# Patient Record
Sex: FEMALE | Race: WHITE | NOT HISPANIC OR LATINO | Employment: FULL TIME | URBAN - METROPOLITAN AREA
[De-identification: names, ages, dates, MRNs, and addresses within clinical notes are randomized per-mention and may not be internally consistent; named-entity substitution may affect disease eponyms.]

---

## 2023-02-25 PROBLEM — S92.354A CLOSED NONDISPLACED FRACTURE OF FIFTH RIGHT METATARSAL BONE: Status: ACTIVE | Noted: 2023-02-25

## 2023-02-25 PROBLEM — F41.9 ANXIETY: Status: ACTIVE | Noted: 2023-02-25

## 2023-02-25 PROBLEM — L70.9 ACNE: Status: ACTIVE | Noted: 2023-02-25

## 2023-02-25 PROBLEM — G56.03 CARPAL TUNNEL SYNDROME ON BOTH SIDES: Status: ACTIVE | Noted: 2023-02-25

## 2023-02-25 PROBLEM — N92.1 MENOMETRORRHAGIA: Status: ACTIVE | Noted: 2023-02-25

## 2023-02-25 PROBLEM — N95.2 VAGINAL ATROPHY: Status: ACTIVE | Noted: 2023-02-25

## 2023-02-25 PROBLEM — Z12.11 ENCOUNTER FOR SCREENING COLONOSCOPY: Status: ACTIVE | Noted: 2023-02-25

## 2023-02-25 PROBLEM — M17.10 ARTHRITIS OF KNEE: Status: ACTIVE | Noted: 2023-02-25

## 2023-02-25 PROBLEM — R05.9 COUGH: Status: ACTIVE | Noted: 2023-02-25

## 2023-02-25 PROBLEM — S99.911A ANKLE INJURY, RIGHT, INITIAL ENCOUNTER: Status: ACTIVE | Noted: 2023-02-25

## 2023-02-25 PROBLEM — I10 HTN (HYPERTENSION): Status: ACTIVE | Noted: 2023-02-25

## 2023-02-25 PROBLEM — H01.119 ALLERGIC BLEPHARITIS: Status: ACTIVE | Noted: 2023-02-25

## 2023-02-25 PROBLEM — R92.30 DENSE BREAST TISSUE: Status: ACTIVE | Noted: 2023-02-25

## 2023-02-25 RX ORDER — TRIAMTERENE AND HYDROCHLOROTHIAZIDE 37.5; 25 MG/1; MG/1
1 CAPSULE ORAL DAILY
COMMUNITY
Start: 2018-05-29 | End: 2023-09-15

## 2023-03-21 ENCOUNTER — OFFICE VISIT (OUTPATIENT)
Dept: PRIMARY CARE | Facility: CLINIC | Age: 63
End: 2023-03-21
Payer: COMMERCIAL

## 2023-03-21 VITALS
BODY MASS INDEX: 27.06 KG/M2 | SYSTOLIC BLOOD PRESSURE: 120 MMHG | OXYGEN SATURATION: 98 % | WEIGHT: 152.78 LBS | DIASTOLIC BLOOD PRESSURE: 72 MMHG | HEART RATE: 84 BPM

## 2023-03-21 DIAGNOSIS — I10 PRIMARY HYPERTENSION: ICD-10-CM

## 2023-03-21 DIAGNOSIS — Z13.220 LIPID SCREENING: Primary | ICD-10-CM

## 2023-03-21 PROCEDURE — 3078F DIAST BP <80 MM HG: CPT | Performed by: INTERNAL MEDICINE

## 2023-03-21 PROCEDURE — 3074F SYST BP LT 130 MM HG: CPT | Performed by: INTERNAL MEDICINE

## 2023-03-21 PROCEDURE — 1036F TOBACCO NON-USER: CPT | Performed by: INTERNAL MEDICINE

## 2023-03-21 PROCEDURE — UHSPHYS PR UH SELECT PHYSICAL: Performed by: INTERNAL MEDICINE

## 2023-03-21 RX ORDER — CLINDAMYCIN PHOSPHATE 10 UG/ML
LOTION TOPICAL
COMMUNITY
Start: 2023-03-02 | End: 2023-03-21 | Stop reason: ALTCHOICE

## 2023-03-21 RX ORDER — METRONIDAZOLE 7.5 MG/G
GEL TOPICAL
COMMUNITY
Start: 2022-04-08 | End: 2023-03-21 | Stop reason: ALTCHOICE

## 2023-03-21 ASSESSMENT — ENCOUNTER SYMPTOMS
UNEXPECTED WEIGHT CHANGE: 0
BACK PAIN: 0
SINUS PAIN: 0
CONFUSION: 0
DIZZINESS: 0
NAUSEA: 0
CONSTITUTIONAL NEGATIVE: 1
WEAKNESS: 0
FREQUENCY: 0
ABDOMINAL DISTENTION: 0
NECK STIFFNESS: 0
FEVER: 0
SLEEP DISTURBANCE: 0
BLOOD IN STOOL: 0
FACIAL SWELLING: 0
CHILLS: 0
DYSPHORIC MOOD: 0
PALPITATIONS: 0
SINUS PRESSURE: 0
FATIGUE: 0
ARTHRALGIAS: 0
CONSTIPATION: 0
MYALGIAS: 0
ACTIVITY CHANGE: 0
NERVOUS/ANXIOUS: 0
ABDOMINAL PAIN: 0
DYSURIA: 0
ADENOPATHY: 0
LIGHT-HEADEDNESS: 0
HYPERACTIVE: 0
CHEST TIGHTNESS: 0
BRUISES/BLEEDS EASILY: 0
SHORTNESS OF BREATH: 0
VOMITING: 0
JOINT SWELLING: 0
POLYDIPSIA: 0
TROUBLE SWALLOWING: 0
WHEEZING: 0
HEADACHES: 0
COUGH: 0
SORE THROAT: 0
DIARRHEA: 0
HEMATURIA: 0
RHINORRHEA: 0
NUMBNESS: 0

## 2023-03-21 NOTE — PROGRESS NOTES
Subjective   Patient ID: Tio Fuentes is a 62 y.o. female who presents for Annual Exam (Formerly [facility]).  Patient is here for complete physical and a general checkup.  She is generally quite healthy without any major complaints.  She does have some mild upper respiratory symptoms which she has had off-and-on over the last few months typically she wakes up with a bit of a stuffy nose and a bit of a scratchy throat but has no real major symptoms during the daytime.  She did have a very bad respiratory infection last fall and despite numerous COVID test that were negative I suspect she did have COVID.  She lost her sense of smell and was quite ill at that time.  She has made a full recovery however.  She is in the middle of getting a work-up for a abnormal mammogram and is scheduled for a repeat mammogram and ultrasound in the near future.  She had bilateral carpal tunnel release and had surgery on her right foot in the last couple of years.  She is made a pretty good recovery from that and is back to working out full-time.  She gets occasional lightheadedness when she stands up quickly but has no difficulty with exercising.  Reviewing her labs her lipids are slightly above target but she had a cardiac calcium score of 0 a number of years ago.  She had a bone density study done 6 months ago which was relatively normal.        Review of Systems   Constitutional: Negative.  Negative for activity change, chills, fatigue, fever and unexpected weight change.   HENT:  Negative for dental problem, ear pain, facial swelling, hearing loss, mouth sores, rhinorrhea, sinus pressure, sinus pain, sore throat, tinnitus and trouble swallowing.    Eyes:  Negative for visual disturbance.   Respiratory:  Negative for cough, chest tightness, shortness of breath and wheezing.    Cardiovascular:  Negative for chest pain, palpitations and leg swelling.   Gastrointestinal:  Negative for abdominal distention, abdominal pain, blood in  stool, constipation, diarrhea, nausea and vomiting.        Colon due in 2029   Endocrine: Negative for cold intolerance, heat intolerance, polydipsia and polyuria.   Genitourinary:  Negative for dysuria, frequency, hematuria and urgency.        Occ stress incont   Musculoskeletal:  Negative for arthralgias, back pain, joint swelling, myalgias and neck stiffness.        S/P bilat carple tunnel release.  R Foot surgery    Skin:  Negative for rash.   Allergic/Immunologic: Negative for food allergies.   Neurological:  Negative for dizziness, weakness, light-headedness, numbness and headaches.   Hematological:  Negative for adenopathy. Does not bruise/bleed easily.   Psychiatric/Behavioral:  Negative for confusion, dysphoric mood and sleep disturbance. The patient is not nervous/anxious and is not hyperactive.        Objective   Physical Exam  Vitals and nursing note reviewed.   Constitutional:       Appearance: Normal appearance. She is normal weight.   HENT:      Head: Normocephalic and atraumatic.      Right Ear: Ear canal and external ear normal. There is impacted cerumen.      Left Ear: Ear canal and external ear normal. There is impacted cerumen.      Nose: Nose normal.      Mouth/Throat:      Mouth: Mucous membranes are moist.      Pharynx: Oropharynx is clear.   Eyes:      Extraocular Movements: Extraocular movements intact.      Conjunctiva/sclera: Conjunctivae normal.      Pupils: Pupils are equal, round, and reactive to light.   Cardiovascular:      Rate and Rhythm: Normal rate and regular rhythm.      Pulses: Normal pulses.      Heart sounds: Normal heart sounds.   Pulmonary:      Effort: Pulmonary effort is normal.      Breath sounds: Normal breath sounds.   Abdominal:      General: Abdomen is flat. Bowel sounds are normal.      Palpations: Abdomen is soft.   Musculoskeletal:         General: Normal range of motion.      Cervical back: Normal range of motion and neck supple.   Lymphadenopathy:      Cervical:  No cervical adenopathy.   Skin:     General: Skin is warm and dry.   Neurological:      General: No focal deficit present.      Mental Status: She is alert and oriented to person, place, and time. Mental status is at baseline.   Psychiatric:         Mood and Affect: Mood normal.         Thought Content: Thought content normal.       Lab Results   Component Value Date    WBC 4.2 (L) 01/13/2023    HGB 13.3 01/13/2023    HCT 38.3 01/13/2023    MCV 93 01/13/2023     01/13/2023       Assessment/Plan     HTN (hypertension)  BP is under good control.  We will continue her current triamterene hydrochlorothiazide prescription.   Patient is up-to-date on colonoscopy  Patient is up-to-date on bone density  Patient is undergoing a work-up for her abnormal mammogram.  Patient is up-to-date on all vaccines  Patient is ears were densely impacted we will have her use Cerumenex and return next week to have them lavaged out.

## 2023-03-21 NOTE — ASSESSMENT & PLAN NOTE
BP is under good control.  We will continue her current triamterene hydrochlorothiazide prescription.

## 2023-03-28 ENCOUNTER — APPOINTMENT (OUTPATIENT)
Dept: PRIMARY CARE | Facility: CLINIC | Age: 63
End: 2023-03-28
Payer: COMMERCIAL

## 2023-09-01 DIAGNOSIS — N30.00 ACUTE CYSTITIS WITHOUT HEMATURIA: Primary | ICD-10-CM

## 2023-09-01 RX ORDER — SULFAMETHOXAZOLE AND TRIMETHOPRIM 800; 160 MG/1; MG/1
1 TABLET ORAL 2 TIMES DAILY
Qty: 6 TABLET | Refills: 3 | Status: SHIPPED | OUTPATIENT
Start: 2023-09-01 | End: 2023-09-13

## 2023-09-15 DIAGNOSIS — I10 PRIMARY HYPERTENSION: Primary | ICD-10-CM

## 2023-09-15 RX ORDER — TRIAMTERENE AND HYDROCHLOROTHIAZIDE 37.5; 25 MG/1; MG/1
1 CAPSULE ORAL DAILY
Qty: 90 CAPSULE | Refills: 2 | Status: SHIPPED | OUTPATIENT
Start: 2023-09-15

## 2023-11-07 ENCOUNTER — OFFICE VISIT (OUTPATIENT)
Dept: PRIMARY CARE | Facility: CLINIC | Age: 63
End: 2023-11-07
Payer: COMMERCIAL

## 2023-11-07 VITALS — DIASTOLIC BLOOD PRESSURE: 62 MMHG | HEART RATE: 74 BPM | SYSTOLIC BLOOD PRESSURE: 118 MMHG | OXYGEN SATURATION: 97 %

## 2023-11-07 DIAGNOSIS — J06.9 VIRAL UPPER RESPIRATORY TRACT INFECTION: Primary | ICD-10-CM

## 2023-11-07 PROCEDURE — 1036F TOBACCO NON-USER: CPT | Performed by: INTERNAL MEDICINE

## 2023-11-07 PROCEDURE — 3078F DIAST BP <80 MM HG: CPT | Performed by: INTERNAL MEDICINE

## 2023-11-07 PROCEDURE — 3074F SYST BP LT 130 MM HG: CPT | Performed by: INTERNAL MEDICINE

## 2023-11-07 PROCEDURE — 99213 OFFICE O/P EST LOW 20 MIN: CPT | Performed by: INTERNAL MEDICINE

## 2023-11-07 RX ORDER — BENZONATATE 200 MG/1
200 CAPSULE ORAL 3 TIMES DAILY PRN
COMMUNITY
Start: 2023-09-07

## 2023-11-07 ASSESSMENT — ENCOUNTER SYMPTOMS
SORE THROAT: 1
WHEEZING: 0
SWOLLEN GLANDS: 0
HEADACHES: 0
RHINORRHEA: 1
COUGH: 1

## 2023-11-07 NOTE — ASSESSMENT & PLAN NOTE
Most likely viral upper respiratory infection with some mild serous otitis on the right patient will continue to use decongestants Flonase and Afrin nasal spray.  She is instructed to use the Afrin for no more than 4 days.  She will continue taking Tylenol for the aches and pains and sore throat.  No indication for antibiotics at this time.  She will call if symptoms do not improve.

## 2023-11-07 NOTE — PROGRESS NOTES
Subjective   Patient ID: Tio Fuentes is a 63 y.o. female who presents for URI.    URI   This is a new problem. The current episode started in the past 7 days. The problem has been unchanged. There has been no fever. Associated symptoms include congestion, coughing, a plugged ear sensation, rhinorrhea, sneezing and a sore throat. Pertinent negatives include no chest pain, ear pain, headaches, swollen glands or wheezing. She has tried antihistamine, decongestant and acetaminophen for the symptoms. The treatment provided mild relief.        Review of Systems   HENT:  Positive for congestion, rhinorrhea, sneezing and sore throat. Negative for ear pain.    Respiratory:  Positive for cough. Negative for wheezing.    Cardiovascular:  Negative for chest pain.   Neurological:  Negative for headaches.       Objective   /62   Pulse 74   SpO2 97%     Physical Exam  HENT:      Right Ear: Tympanic membrane normal.      Left Ear: Tympanic membrane normal.      Ears:      Comments: Small amount of serous material behind the right TM no erythema or purulence.     Nose: Nose normal.      Mouth/Throat:      Pharynx: Oropharynx is clear. No oropharyngeal exudate or posterior oropharyngeal erythema.   Cardiovascular:      Heart sounds: Normal heart sounds.   Pulmonary:      Breath sounds: Normal breath sounds.   Lymphadenopathy:      Cervical: No cervical adenopathy.         Assessment/Plan   Problem List Items Addressed This Visit             ICD-10-CM    Viral upper respiratory tract infection - Primary J06.9     Most likely viral upper respiratory infection with some mild serous otitis on the right patient will continue to use decongestants Flonase and Afrin nasal spray.  She is instructed to use the Afrin for no more than 4 days.  She will continue taking Tylenol for the aches and pains and sore throat.  No indication for antibiotics at this time.  She will call if symptoms do not improve.

## 2023-12-12 ENCOUNTER — TELEPHONE (OUTPATIENT)
Dept: PRIMARY CARE | Facility: CLINIC | Age: 63
End: 2023-12-12
Payer: COMMERCIAL

## 2023-12-12 DIAGNOSIS — H10.33 ACUTE CONJUNCTIVITIS OF BOTH EYES, UNSPECIFIED ACUTE CONJUNCTIVITIS TYPE: Primary | ICD-10-CM

## 2023-12-12 RX ORDER — TOBRAMYCIN 3 MG/ML
1 SOLUTION/ DROPS OPHTHALMIC EVERY 4 HOURS
Qty: 5 ML | Refills: 0 | Status: SHIPPED | OUTPATIENT
Start: 2023-12-12 | End: 2023-12-26

## 2023-12-12 NOTE — TELEPHONE ENCOUNTER
----- Message from Sarkis Singer MA sent at 12/12/2023 10:00 AM EST -----  Contact: [phone]  Patient states she has pink eye. Both of her grand kids have it.  She wonders if you can call eye drops in for her to [facility].    Call the patient children and grandchildren all have the same condition.  Will start her on Tobrex eyedrops she will call if symptoms do not improve.

## 2024-02-23 ENCOUNTER — TELEPHONE (OUTPATIENT)
Dept: OBSTETRICS AND GYNECOLOGY | Facility: CLINIC | Age: 64
End: 2024-02-23

## 2024-02-23 ENCOUNTER — LAB (OUTPATIENT)
Dept: LAB | Facility: LAB | Age: 64
End: 2024-02-23
Payer: COMMERCIAL

## 2024-02-23 DIAGNOSIS — Z79.899 OTHER LONG TERM (CURRENT) DRUG THERAPY: ICD-10-CM

## 2024-02-23 DIAGNOSIS — Z12.31 SCREENING MAMMOGRAM FOR BREAST CANCER: Primary | ICD-10-CM

## 2024-02-23 DIAGNOSIS — B35.1 TINEA UNGUIUM: Primary | ICD-10-CM

## 2024-02-23 LAB
ALBUMIN SERPL BCP-MCNC: 4.8 G/DL (ref 3.4–5)
ALP SERPL-CCNC: 64 U/L (ref 33–136)
ALT SERPL W P-5'-P-CCNC: 25 U/L (ref 7–45)
AST SERPL W P-5'-P-CCNC: 22 U/L (ref 9–39)
BILIRUB DIRECT SERPL-MCNC: 0.1 MG/DL (ref 0–0.3)
BILIRUB SERPL-MCNC: 0.7 MG/DL (ref 0–1.2)
PROT SERPL-MCNC: 7.4 G/DL (ref 6.4–8.2)

## 2024-02-23 PROCEDURE — 80076 HEPATIC FUNCTION PANEL: CPT

## 2024-02-23 NOTE — TELEPHONE ENCOUNTER
Pt needs mammogram order sent in was supposed to have appt feb 23 rescheduled to may 28 please advise thank you

## 2024-03-01 ENCOUNTER — APPOINTMENT (OUTPATIENT)
Dept: PRIMARY CARE | Facility: CLINIC | Age: 64
End: 2024-03-01
Payer: COMMERCIAL

## 2024-03-01 DIAGNOSIS — J02.9 EXUDATIVE PHARYNGITIS: Primary | ICD-10-CM

## 2024-03-01 RX ORDER — AMOXICILLIN 500 MG/1
500 CAPSULE ORAL EVERY 8 HOURS SCHEDULED
Qty: 30 CAPSULE | Refills: 0 | Status: SHIPPED | OUTPATIENT
Start: 2024-03-01 | End: 2024-03-11

## 2024-03-01 NOTE — PROGRESS NOTES
Spoke with patient who has an upper respiratory infection with a severe sore throat.  She has severe sore throat that makes it difficult for her to swallow she has tender cervical adenopathy and she has been around a couple of grandchildren that have had strep.  She states that this is similar symptoms to what she had in the past when she has had strep as a young person.  Patient is unable to come in today but we will treat her presumptively for strep pharyngitis with amoxicillin 5 mg 3 times a day for 7 to 10 days.  She will call if symptoms do not improve.

## 2024-04-04 ENCOUNTER — HOSPITAL ENCOUNTER (OUTPATIENT)
Dept: RADIOLOGY | Facility: CLINIC | Age: 64
Discharge: HOME | End: 2024-04-04
Payer: COMMERCIAL

## 2024-04-04 VITALS — BODY MASS INDEX: 25.69 KG/M2 | WEIGHT: 145 LBS | HEIGHT: 63 IN

## 2024-04-04 DIAGNOSIS — Z12.31 SCREENING MAMMOGRAM FOR BREAST CANCER: ICD-10-CM

## 2024-04-04 PROCEDURE — 77063 BREAST TOMOSYNTHESIS BI: CPT | Performed by: RADIOLOGY

## 2024-04-04 PROCEDURE — 77067 SCR MAMMO BI INCL CAD: CPT

## 2024-04-04 PROCEDURE — 77067 SCR MAMMO BI INCL CAD: CPT | Performed by: RADIOLOGY

## 2024-05-28 ENCOUNTER — OFFICE VISIT (OUTPATIENT)
Dept: OBSTETRICS AND GYNECOLOGY | Facility: CLINIC | Age: 64
End: 2024-05-28
Payer: COMMERCIAL

## 2024-05-28 VITALS
WEIGHT: 157 LBS | DIASTOLIC BLOOD PRESSURE: 74 MMHG | BODY MASS INDEX: 27.82 KG/M2 | HEIGHT: 63 IN | SYSTOLIC BLOOD PRESSURE: 123 MMHG

## 2024-05-28 DIAGNOSIS — Z01.419 ENCOUNTER FOR GYNECOLOGICAL EXAMINATION WITHOUT ABNORMAL FINDING: Primary | ICD-10-CM

## 2024-05-28 DIAGNOSIS — Z78.0 OSTEOPENIA AFTER MENOPAUSE: ICD-10-CM

## 2024-05-28 DIAGNOSIS — M85.80 OSTEOPENIA AFTER MENOPAUSE: ICD-10-CM

## 2024-05-28 PROCEDURE — 1036F TOBACCO NON-USER: CPT | Performed by: OBSTETRICS & GYNECOLOGY

## 2024-05-28 PROCEDURE — 3074F SYST BP LT 130 MM HG: CPT | Performed by: OBSTETRICS & GYNECOLOGY

## 2024-05-28 PROCEDURE — 99396 PREV VISIT EST AGE 40-64: CPT | Performed by: OBSTETRICS & GYNECOLOGY

## 2024-05-28 PROCEDURE — 3078F DIAST BP <80 MM HG: CPT | Performed by: OBSTETRICS & GYNECOLOGY

## 2024-05-28 NOTE — PROGRESS NOTES
Subjective   Tio Fuentes is a 63 y.o. female here for a routine exam.  We discussed she returned yesterday from a trip to [place].  She has been feeling well.    No postmenopausal bleeding or pelvic pain.  No dysuria, no change in bowel habits or vaginal discharge.    She is current on her colonoscopy.    She has a bone density from May 2022 that shows a T-score of the left hip -2.2.  Personal health questionnaire reviewed: yes.     Gynecologic History  No LMP recorded. Patient is postmenopausal.  Contraception: post menopausal status  Last Pap: 23. Results were: normal  Last mammogram: 24. Results were: normal    Obstetric History  OB History    Para Term  AB Living   4 2           SAB IAB Ectopic Multiple Live Births                  # Outcome Date GA Lbr Malcolm/2nd Weight Sex Delivery Anes PTL Lv   4             3             2 Para            1 Para                Objective   Constitutional: Alert and in no acute distress. Well developed, well nourished.   Head and Face: Head and face: Normal.    Eyes: Normal external exam - nonicteric sclera, extraocular movements intact (EOMI) and no ptosis.   Neck: No neck asymmetry. Supple. Thyroid not enlarged and there were no palpable thyroid nodules.    Pulmonary: No respiratory distress.   Chest: Breasts: Normal appearance, no nipple discharge and no skin changes. Palpation of breasts and axillae: No palpable mass and no axillary lymphadenopathy.   Abdomen: Soft nontender; no abdominal mass palpated. No organomegaly. No hernias.  Well-healed abdominoplasty scars noted.  Genitourinary: External genitalia: Normal. No inguinal lymphadenopathy. Bartholin's Urethral and Skenes Glands: Normal. Urethra: Normal.  Bladder: Normal on palpation. Vagina: Normal. Cervix: Normal.  Uterus: Normal.  Right Adnexa/parametria: Normal.  Left Adnexa/parametria: Normal.  Inspection of Perianal Area: Normal.   Musculoskeletal: No joint swelling seen,  normal movements of all extremities.   Skin: Normal skin color and pigmentation, normal skin turgor, and no rash.   Neurologic: Non-focal. Grossly intact.   Psychiatric: Alert and oriented x 3. Affect normal to patient baseline. Mood: Appropriate.  Physical Exam     Assessment/Plan   Healthy female exam.  This is a 63-year-old female with a normal exam.  No Pap was sent, she is high risk HPV negative.    Her routine mammogram is due in April 2025.    We discussed obtaining a bone density test to monitor the osteopenia.  We briefly discussed medications such as Fosamax if there is a drop in bone density.  I will see her routinely in 1 year.  Mammogram ordered.

## 2024-06-06 ENCOUNTER — HOSPITAL ENCOUNTER (OUTPATIENT)
Dept: RADIOLOGY | Facility: CLINIC | Age: 64
Discharge: HOME | End: 2024-06-06
Payer: COMMERCIAL

## 2024-06-06 DIAGNOSIS — M85.80 OSTEOPENIA AFTER MENOPAUSE: ICD-10-CM

## 2024-06-06 DIAGNOSIS — Z78.0 OSTEOPENIA AFTER MENOPAUSE: ICD-10-CM

## 2024-06-06 PROCEDURE — 77080 DXA BONE DENSITY AXIAL: CPT

## 2024-07-22 DIAGNOSIS — Z00.00 ANNUAL PHYSICAL EXAM: ICD-10-CM

## 2024-07-29 DIAGNOSIS — I10 PRIMARY HYPERTENSION: ICD-10-CM

## 2024-07-29 RX ORDER — TRIAMTERENE AND HYDROCHLOROTHIAZIDE 37.5; 25 MG/1; MG/1
1 CAPSULE ORAL DAILY
Qty: 90 CAPSULE | Refills: 2 | Status: SHIPPED | OUTPATIENT
Start: 2024-07-29

## 2024-08-22 ENCOUNTER — LAB (OUTPATIENT)
Dept: LAB | Facility: LAB | Age: 64
End: 2024-08-22
Payer: COMMERCIAL

## 2024-08-22 DIAGNOSIS — Z00.00 ANNUAL PHYSICAL EXAM: ICD-10-CM

## 2024-08-22 LAB
25(OH)D3 SERPL-MCNC: 32 NG/ML (ref 30–100)
ALBUMIN SERPL BCP-MCNC: 4.6 G/DL (ref 3.4–5)
ALP SERPL-CCNC: 60 U/L (ref 33–136)
ALT SERPL W P-5'-P-CCNC: 20 U/L (ref 7–45)
ANION GAP SERPL CALC-SCNC: 13 MMOL/L (ref 10–20)
APPEARANCE UR: CLEAR
AST SERPL W P-5'-P-CCNC: 20 U/L (ref 9–39)
BASOPHILS # BLD AUTO: 0.03 X10*3/UL (ref 0–0.1)
BASOPHILS NFR BLD AUTO: 0.6 %
BILIRUB SERPL-MCNC: 0.9 MG/DL (ref 0–1.2)
BILIRUB UR STRIP.AUTO-MCNC: NEGATIVE MG/DL
BUN SERPL-MCNC: 18 MG/DL (ref 6–23)
CALCIUM SERPL-MCNC: 9.6 MG/DL (ref 8.6–10.3)
CHLORIDE SERPL-SCNC: 101 MMOL/L (ref 98–107)
CHOLEST SERPL-MCNC: 214 MG/DL (ref 0–199)
CHOLESTEROL/HDL RATIO: 4
CO2 SERPL-SCNC: 27 MMOL/L (ref 21–32)
COLOR UR: COLORLESS
CREAT SERPL-MCNC: 0.91 MG/DL (ref 0.5–1.05)
EGFRCR SERPLBLD CKD-EPI 2021: 71 ML/MIN/1.73M*2
EOSINOPHIL # BLD AUTO: 0.06 X10*3/UL (ref 0–0.7)
EOSINOPHIL NFR BLD AUTO: 1.2 %
ERYTHROCYTE [DISTWIDTH] IN BLOOD BY AUTOMATED COUNT: 11.7 % (ref 11.5–14.5)
EST. AVERAGE GLUCOSE BLD GHB EST-MCNC: 105 MG/DL
GLUCOSE SERPL-MCNC: 86 MG/DL (ref 74–99)
GLUCOSE UR STRIP.AUTO-MCNC: NORMAL MG/DL
HBA1C MFR BLD: 5.3 %
HCT VFR BLD AUTO: 41.9 % (ref 36–46)
HDLC SERPL-MCNC: 53.3 MG/DL
HGB BLD-MCNC: 14.6 G/DL (ref 12–16)
IMM GRANULOCYTES # BLD AUTO: 0.01 X10*3/UL (ref 0–0.7)
IMM GRANULOCYTES NFR BLD AUTO: 0.2 % (ref 0–0.9)
KETONES UR STRIP.AUTO-MCNC: NEGATIVE MG/DL
LDLC SERPL CALC-MCNC: 136 MG/DL
LEUKOCYTE ESTERASE UR QL STRIP.AUTO: ABNORMAL
LYMPHOCYTES # BLD AUTO: 1.88 X10*3/UL (ref 1.2–4.8)
LYMPHOCYTES NFR BLD AUTO: 37.2 %
MCH RBC QN AUTO: 33.1 PG (ref 26–34)
MCHC RBC AUTO-ENTMCNC: 34.8 G/DL (ref 32–36)
MCV RBC AUTO: 95 FL (ref 80–100)
MONOCYTES # BLD AUTO: 0.47 X10*3/UL (ref 0.1–1)
MONOCYTES NFR BLD AUTO: 9.3 %
NEUTROPHILS # BLD AUTO: 2.6 X10*3/UL (ref 1.2–7.7)
NEUTROPHILS NFR BLD AUTO: 51.5 %
NITRITE UR QL STRIP.AUTO: NEGATIVE
NON HDL CHOLESTEROL: 161 MG/DL (ref 0–149)
NRBC BLD-RTO: 0 /100 WBCS (ref 0–0)
PH UR STRIP.AUTO: 7 [PH]
PLATELET # BLD AUTO: 179 X10*3/UL (ref 150–450)
POTASSIUM SERPL-SCNC: 4.1 MMOL/L (ref 3.5–5.3)
PROT SERPL-MCNC: 7.2 G/DL (ref 6.4–8.2)
PROT UR STRIP.AUTO-MCNC: NEGATIVE MG/DL
RBC # BLD AUTO: 4.41 X10*6/UL (ref 4–5.2)
RBC # UR STRIP.AUTO: NEGATIVE /UL
RBC #/AREA URNS AUTO: NORMAL /HPF
SODIUM SERPL-SCNC: 137 MMOL/L (ref 136–145)
SP GR UR STRIP.AUTO: 1.01
TRIGL SERPL-MCNC: 126 MG/DL (ref 0–149)
TSH SERPL-ACNC: 1.97 MIU/L (ref 0.44–3.98)
UROBILINOGEN UR STRIP.AUTO-MCNC: NORMAL MG/DL
VLDL: 25 MG/DL (ref 0–40)
WBC # BLD AUTO: 5.1 X10*3/UL (ref 4.4–11.3)
WBC #/AREA URNS AUTO: NORMAL /HPF

## 2024-08-22 PROCEDURE — 80053 COMPREHEN METABOLIC PANEL: CPT

## 2024-08-22 PROCEDURE — 84443 ASSAY THYROID STIM HORMONE: CPT

## 2024-08-22 PROCEDURE — 36415 COLL VENOUS BLD VENIPUNCTURE: CPT

## 2024-08-22 PROCEDURE — 85025 COMPLETE CBC W/AUTO DIFF WBC: CPT

## 2024-08-22 PROCEDURE — 81001 URINALYSIS AUTO W/SCOPE: CPT

## 2024-08-22 PROCEDURE — 80061 LIPID PANEL: CPT

## 2024-08-30 ENCOUNTER — APPOINTMENT (OUTPATIENT)
Dept: PRIMARY CARE | Facility: CLINIC | Age: 64
End: 2024-08-30
Payer: COMMERCIAL

## 2024-09-06 ENCOUNTER — APPOINTMENT (OUTPATIENT)
Dept: PRIMARY CARE | Facility: CLINIC | Age: 64
End: 2024-09-06
Payer: COMMERCIAL

## 2024-09-06 VITALS
SYSTOLIC BLOOD PRESSURE: 120 MMHG | HEIGHT: 63 IN | HEART RATE: 100 BPM | BODY MASS INDEX: 27.54 KG/M2 | OXYGEN SATURATION: 98 % | DIASTOLIC BLOOD PRESSURE: 76 MMHG | WEIGHT: 155.4 LBS

## 2024-09-06 DIAGNOSIS — E78.2 MIXED HYPERLIPIDEMIA: ICD-10-CM

## 2024-09-06 DIAGNOSIS — M85.80 OSTEOPENIA AFTER MENOPAUSE: ICD-10-CM

## 2024-09-06 DIAGNOSIS — I10 PRIMARY HYPERTENSION: ICD-10-CM

## 2024-09-06 DIAGNOSIS — I10 PRIMARY HYPERTENSION: Primary | ICD-10-CM

## 2024-09-06 DIAGNOSIS — N95.2 VAGINAL ATROPHY: ICD-10-CM

## 2024-09-06 DIAGNOSIS — Z78.0 OSTEOPENIA AFTER MENOPAUSE: ICD-10-CM

## 2024-09-06 RX ORDER — ATORVASTATIN CALCIUM 10 MG/1
10 TABLET, FILM COATED ORAL DAILY
Qty: 30 TABLET | Refills: 11 | Status: SHIPPED | OUTPATIENT
Start: 2024-09-06 | End: 2025-09-01

## 2024-09-06 RX ORDER — ATORVASTATIN CALCIUM 10 MG/1
10 TABLET, FILM COATED ORAL DAILY
Qty: 30 TABLET | Refills: 11 | Status: SHIPPED
Start: 2024-09-06 | End: 2024-09-06 | Stop reason: SDUPTHER

## 2024-09-06 ASSESSMENT — ENCOUNTER SYMPTOMS
DIARRHEA: 0
RHINORRHEA: 0
TROUBLE SWALLOWING: 0
CONFUSION: 0
LIGHT-HEADEDNESS: 0
MYALGIAS: 0
CONSTITUTIONAL NEGATIVE: 1
CONSTIPATION: 0
HEMATURIA: 0
FEVER: 0
POLYDIPSIA: 0
COUGH: 0
SLEEP DISTURBANCE: 0
FACIAL SWELLING: 0
NECK STIFFNESS: 0
NUMBNESS: 0
SORE THROAT: 0
FREQUENCY: 0
SINUS PRESSURE: 0
CHILLS: 0
ARTHRALGIAS: 0
CHEST TIGHTNESS: 0
DYSURIA: 0
SHORTNESS OF BREATH: 0
DIZZINESS: 0
ABDOMINAL DISTENTION: 0
FATIGUE: 0
SINUS PAIN: 0
WEAKNESS: 0
VOMITING: 0
BLOOD IN STOOL: 0
ABDOMINAL PAIN: 0
UNEXPECTED WEIGHT CHANGE: 0
HYPERACTIVE: 0
NERVOUS/ANXIOUS: 0
BRUISES/BLEEDS EASILY: 0
DYSPHORIC MOOD: 0
PALPITATIONS: 0
BACK PAIN: 0
ACTIVITY CHANGE: 0
ADENOPATHY: 0
NAUSEA: 0
JOINT SWELLING: 0
HEADACHES: 0
WHEEZING: 0

## 2024-09-06 NOTE — ASSESSMENT & PLAN NOTE
Lipids are above goal with an LDL of 130.  We will start her on atorvastatin 10 mg daily.  Also get a cardiac calcium score to further stratify her risk.  She return in 6 weeks for repeat lipids and liver enzymes.

## 2024-09-06 NOTE — PROGRESS NOTES
Tio Fuentes       Patient is here for a complete physical and a general checkup.  She feels generally quite well without any major complaints.  She does have some mild pain in her right neck and shoulder which sounds like a cervical radicular symptom.  She has some paresthesias in the trapezius and right shoulder from time to time there is not much in the way of pain there is no radiation of the pain down the arm.  There is no symptoms on the left.  She has not taken any medication for it and is not interested in taking anything at this point.  She is concerned about her weight which is up about 10 pounds from last year.  She has been doing aggressive [employer] weight training program and has gained a fair amount of muscle mass.  Her close fit about the same as they have.  Note is made of her slightly elevated lipids.  Her last cardiac calcium score was about 10 years ago.  She would like to start a statin at this point I think it makes sense she does have a family history of heart disease.  She is having a lot of trouble with perimenopausal symptoms she is having some hot flashes some weight gain and some difficulty with dyspareunia.  She saw her GYN who gave her some vaginal estrogen cream but that did not help and her cause some breast soreness.  She has not been back for further evaluation.  She is up-to-date on colonoscopy  She has had her shingles vaccine.           Review of Systems   Constitutional: Negative.  Negative for activity change, chills, fatigue, fever and unexpected weight change.   HENT:  Negative for dental problem, ear pain, facial swelling, hearing loss, mouth sores, rhinorrhea, sinus pressure, sinus pain, sore throat, tinnitus and trouble swallowing.    Eyes:  Negative for visual disturbance.   Respiratory:  Negative for cough, chest tightness, shortness of breath and wheezing.    Cardiovascular:  Negative for chest pain, palpitations and leg swelling.   Gastrointestinal:  Negative for  "abdominal distention, abdominal pain, blood in stool, constipation, diarrhea, nausea and vomiting.   Endocrine: Negative for cold intolerance, heat intolerance, polydipsia and polyuria.   Genitourinary:  Negative for dysuria, frequency, hematuria and urgency.   Musculoskeletal:  Negative for arthralgias, back pain, joint swelling, myalgias and neck stiffness.        R shoulder pain    Skin:  Negative for rash.   Allergic/Immunologic: Negative for food allergies.   Neurological:  Negative for dizziness, weakness, light-headedness, numbness and headaches.   Hematological:  Negative for adenopathy. Does not bruise/bleed easily.   Psychiatric/Behavioral:  Negative for confusion, dysphoric mood and sleep disturbance. The patient is not nervous/anxious and is not hyperactive.           Objective      Visit Vitals  /76   Pulse 100   Ht 1.601 m (5' 3.03\")   Wt 70.5 kg (155 lb 6.4 oz)   SpO2 98%   BMI 27.50 kg/m²   OB Status Postmenopausal   Smoking Status Former   BSA 1.77 m²        Physical Exam  Constitutional:       Appearance: Normal appearance. She is normal weight.   HENT:      Head: Normocephalic and atraumatic.      Right Ear: Tympanic membrane, ear canal and external ear normal.      Left Ear: Tympanic membrane, ear canal and external ear normal.      Nose: Nose normal.      Mouth/Throat:      Mouth: Mucous membranes are moist. No oral lesions.      Pharynx: Oropharynx is clear. Uvula midline. No oropharyngeal exudate or posterior oropharyngeal erythema.   Neck:      Thyroid: No thyroid mass or thyromegaly.      Vascular: No carotid bruit or JVD.   Cardiovascular:      Rate and Rhythm: Normal rate and regular rhythm.      Pulses: Normal pulses.           Carotid pulses are 2+ on the right side and 2+ on the left side.       Radial pulses are 2+ on the right side and 2+ on the left side.        Femoral pulses are 2+ on the right side and 2+ on the left side.       Popliteal pulses are 2+ on the right side and " 2+ on the left side.        Dorsalis pedis pulses are 2+ on the right side and 2+ on the left side.        Posterior tibial pulses are 2+ on the right side and 2+ on the left side.      Heart sounds: Normal heart sounds, S1 normal and S2 normal. No murmur heard.  Pulmonary:      Effort: Pulmonary effort is normal.      Breath sounds: Normal breath sounds.   Abdominal:      General: Abdomen is flat. Bowel sounds are normal.      Palpations: Abdomen is soft.      Tenderness: There is no abdominal tenderness.      Hernia: No hernia is present.   Musculoskeletal:         General: No swelling or tenderness. Normal range of motion.      Cervical back: Normal range of motion and neck supple. No rigidity.      Right lower leg: No edema.      Left lower leg: No edema.   Lymphadenopathy:      Cervical: No cervical adenopathy.   Skin:     General: Skin is warm and dry.      Findings: No lesion or rash.   Neurological:      General: No focal deficit present.      Mental Status: She is alert and oriented to person, place, and time. Mental status is at baseline.   Psychiatric:         Mood and Affect: Mood normal.         Thought Content: Thought content normal.              Assess/Plan SmartLinks:   Problem List Items Addressed This Visit             ICD-10-CM    HTN (hypertension) - Primary I10     Blood pressure is well-controlled on current dose of triamterene hydrochlorothiazide.  All chemistries are low.         Relevant Medications    atorvastatin (Lipitor) 10 mg tablet    Other Relevant Orders    CT cardiac scoring wo IV contrast    Lipid Panel    Comprehensive Metabolic Panel    Vaginal atrophy N95.2     Patient having a lot of perimenopausal symptoms.  I refer her back to her gynecologist for further management estrogen replacement therapy I think she would benefit from it.         Mixed hyperlipidemia E78.2     Lipids are above goal with an LDL of 130.  We will start her on atorvastatin 10 mg daily.  Also get a cardiac  calcium score to further stratify her risk.  She return in 6 weeks for repeat lipids and liver enzymes.         Relevant Medications    atorvastatin (Lipitor) 10 mg tablet    Other Relevant Orders    CT cardiac scoring wo IV contrast    Lipid Panel    Comprehensive Metabolic Panel    Osteopenia after menopause M85.80, Z78.0     Patient's bone density was recently checked and is osteopenic but stable over the last 6 years.  She remains on calcium and vitamin D only she is doing a lot of aggressive weightbearing exercise.  Will continue to monitor her.  She would benefit from hormone replacement therapy.        Her shingles vaccine is up-to-date.  She is up-to-date on colonoscopy  Will get her set up with our dietitian to evaluate and start a calorie restriction diet  She will meet with our  to do a fitness assessment.

## 2024-09-06 NOTE — ASSESSMENT & PLAN NOTE
Blood pressure is well-controlled on current dose of triamterene hydrochlorothiazide.  All chemistries are low.

## 2024-09-06 NOTE — ASSESSMENT & PLAN NOTE
Patient having a lot of perimenopausal symptoms.  I refer her back to her gynecologist for further management estrogen replacement therapy I think she would benefit from it.

## 2024-09-06 NOTE — ASSESSMENT & PLAN NOTE
Patient's bone density was recently checked and is osteopenic but stable over the last 6 years.  She remains on calcium and vitamin D only she is doing a lot of aggressive weightbearing exercise.  Will continue to monitor her.  She would benefit from hormone replacement therapy.

## 2024-09-20 ENCOUNTER — APPOINTMENT (OUTPATIENT)
Dept: OBSTETRICS AND GYNECOLOGY | Facility: CLINIC | Age: 64
End: 2024-09-20
Payer: COMMERCIAL

## 2024-09-20 VITALS — BODY MASS INDEX: 27.96 KG/M2 | SYSTOLIC BLOOD PRESSURE: 120 MMHG | DIASTOLIC BLOOD PRESSURE: 80 MMHG | WEIGHT: 158 LBS

## 2024-09-20 DIAGNOSIS — N95.2 VAGINAL ATROPHY: Primary | ICD-10-CM

## 2024-09-20 PROCEDURE — 3074F SYST BP LT 130 MM HG: CPT | Performed by: OBSTETRICS & GYNECOLOGY

## 2024-09-20 PROCEDURE — 99214 OFFICE O/P EST MOD 30 MIN: CPT | Performed by: OBSTETRICS & GYNECOLOGY

## 2024-09-20 PROCEDURE — 3079F DIAST BP 80-89 MM HG: CPT | Performed by: OBSTETRICS & GYNECOLOGY

## 2024-09-20 RX ORDER — ESTRADIOL 10 UG/1
10 INSERT VAGINAL 2 TIMES WEEKLY
Qty: 18 TABLET | Refills: 2 | Status: SHIPPED | OUTPATIENT
Start: 2024-09-23 | End: 2024-09-20

## 2024-09-20 RX ORDER — ESTRADIOL 10 UG/1
10 INSERT VAGINAL 2 TIMES WEEKLY
Qty: 18 TABLET | Refills: 2 | Status: SHIPPED | OUTPATIENT
Start: 2024-09-23

## 2024-11-04 DIAGNOSIS — R05.3 CHRONIC COUGH: Primary | ICD-10-CM

## 2024-11-04 DIAGNOSIS — R05.9 COUGH, UNSPECIFIED TYPE: ICD-10-CM

## 2024-11-04 RX ORDER — HYDROCODONE BITARTRATE AND HOMATROPINE METHYLBROMIDE ORAL SOLUTION 5; 1.5 MG/5ML; MG/5ML
5 LIQUID ORAL EVERY 6 HOURS PRN
Qty: 100 ML | Refills: 0 | Status: SHIPPED | OUTPATIENT
Start: 2024-11-04 | End: 2024-11-09

## 2024-11-08 ENCOUNTER — TELEPHONE (OUTPATIENT)
Dept: PRIMARY CARE | Facility: CLINIC | Age: 64
End: 2024-11-08
Payer: COMMERCIAL

## 2024-11-08 ENCOUNTER — HOSPITAL ENCOUNTER (OUTPATIENT)
Dept: RADIOLOGY | Facility: HOSPITAL | Age: 64
Discharge: HOME | End: 2024-11-08
Payer: COMMERCIAL

## 2024-11-08 DIAGNOSIS — I10 PRIMARY HYPERTENSION: Primary | ICD-10-CM

## 2024-11-08 DIAGNOSIS — R05.9 COUGH, UNSPECIFIED TYPE: ICD-10-CM

## 2024-11-08 DIAGNOSIS — I10 PRIMARY HYPERTENSION: ICD-10-CM

## 2024-11-08 PROCEDURE — 71046 X-RAY EXAM CHEST 2 VIEWS: CPT

## 2024-11-18 ENCOUNTER — HOSPITAL ENCOUNTER (OUTPATIENT)
Dept: RADIOLOGY | Facility: CLINIC | Age: 64
Discharge: HOME | End: 2024-11-18
Payer: COMMERCIAL

## 2024-11-18 DIAGNOSIS — E78.2 MIXED HYPERLIPIDEMIA: ICD-10-CM

## 2024-11-18 DIAGNOSIS — I10 PRIMARY HYPERTENSION: ICD-10-CM

## 2024-11-18 PROCEDURE — 75571 CT HRT W/O DYE W/CA TEST: CPT

## 2024-12-05 ENCOUNTER — APPOINTMENT (OUTPATIENT)
Dept: UROLOGY | Facility: CLINIC | Age: 64
End: 2024-12-05
Payer: COMMERCIAL

## 2025-04-07 ENCOUNTER — APPOINTMENT (OUTPATIENT)
Dept: RADIOLOGY | Facility: CLINIC | Age: 65
End: 2025-04-07
Payer: COMMERCIAL

## 2025-04-08 ENCOUNTER — HOSPITAL ENCOUNTER (OUTPATIENT)
Dept: RADIOLOGY | Facility: CLINIC | Age: 65
Discharge: HOME | End: 2025-04-08
Payer: COMMERCIAL

## 2025-04-08 VITALS — HEIGHT: 63 IN | WEIGHT: 158 LBS | BODY MASS INDEX: 28 KG/M2

## 2025-04-08 DIAGNOSIS — Z12.31 ENCOUNTER FOR SCREENING MAMMOGRAM FOR MALIGNANT NEOPLASM OF BREAST: ICD-10-CM

## 2025-04-08 DIAGNOSIS — Z01.419 ENCOUNTER FOR GYNECOLOGICAL EXAMINATION WITHOUT ABNORMAL FINDING: ICD-10-CM

## 2025-04-08 PROCEDURE — 77067 SCR MAMMO BI INCL CAD: CPT | Performed by: RADIOLOGY

## 2025-04-08 PROCEDURE — 77063 BREAST TOMOSYNTHESIS BI: CPT | Performed by: RADIOLOGY

## 2025-04-08 PROCEDURE — 77063 BREAST TOMOSYNTHESIS BI: CPT

## 2025-05-30 DIAGNOSIS — I10 PRIMARY HYPERTENSION: ICD-10-CM

## 2025-05-30 RX ORDER — TRIAMTERENE AND HYDROCHLOROTHIAZIDE 37.5; 25 MG/1; MG/1
1 CAPSULE ORAL DAILY
Qty: 90 CAPSULE | Refills: 0 | Status: SHIPPED | OUTPATIENT
Start: 2025-05-30

## 2025-06-02 ENCOUNTER — APPOINTMENT (OUTPATIENT)
Dept: OBSTETRICS AND GYNECOLOGY | Facility: CLINIC | Age: 65
End: 2025-06-02
Payer: COMMERCIAL

## 2025-06-02 VITALS
HEIGHT: 63 IN | WEIGHT: 159 LBS | BODY MASS INDEX: 28.17 KG/M2 | DIASTOLIC BLOOD PRESSURE: 86 MMHG | HEART RATE: 80 BPM | SYSTOLIC BLOOD PRESSURE: 136 MMHG

## 2025-06-02 DIAGNOSIS — N95.2 VAGINAL ATROPHY: ICD-10-CM

## 2025-06-02 DIAGNOSIS — Z01.419 ENCOUNTER FOR GYNECOLOGICAL EXAMINATION WITHOUT ABNORMAL FINDING: Primary | ICD-10-CM

## 2025-06-02 PROCEDURE — 3008F BODY MASS INDEX DOCD: CPT | Performed by: OBSTETRICS & GYNECOLOGY

## 2025-06-02 PROCEDURE — 3075F SYST BP GE 130 - 139MM HG: CPT | Performed by: OBSTETRICS & GYNECOLOGY

## 2025-06-02 PROCEDURE — 99396 PREV VISIT EST AGE 40-64: CPT | Performed by: OBSTETRICS & GYNECOLOGY

## 2025-06-02 PROCEDURE — 1036F TOBACCO NON-USER: CPT | Performed by: OBSTETRICS & GYNECOLOGY

## 2025-06-02 PROCEDURE — 3079F DIAST BP 80-89 MM HG: CPT | Performed by: OBSTETRICS & GYNECOLOGY

## 2025-06-02 RX ORDER — ESTRADIOL 0.1 MG/G
0.5 CREAM VAGINAL 3 TIMES WEEKLY
Qty: 42.5 G | Refills: 3 | Status: SHIPPED | OUTPATIENT
Start: 2025-06-02 | End: 2026-06-02

## 2025-06-02 ASSESSMENT — PATIENT HEALTH QUESTIONNAIRE - PHQ9
2. FEELING DOWN, DEPRESSED OR HOPELESS: NOT AT ALL
SUM OF ALL RESPONSES TO PHQ9 QUESTIONS 1 AND 2: 0
1. LITTLE INTEREST OR PLEASURE IN DOING THINGS: NOT AT ALL

## 2025-06-02 ASSESSMENT — ENCOUNTER SYMPTOMS
DEPRESSION: 0
OCCASIONAL FEELINGS OF UNSTEADINESS: 0
LOSS OF SENSATION IN FEET: 0

## 2025-06-02 ASSESSMENT — COLUMBIA-SUICIDE SEVERITY RATING SCALE - C-SSRS
2. HAVE YOU ACTUALLY HAD ANY THOUGHTS OF KILLING YOURSELF?: NO
6. HAVE YOU EVER DONE ANYTHING, STARTED TO DO ANYTHING, OR PREPARED TO DO ANYTHING TO END YOUR LIFE?: NO
1. IN THE PAST MONTH, HAVE YOU WISHED YOU WERE DEAD OR WISHED YOU COULD GO TO SLEEP AND NOT WAKE UP?: NO

## 2025-06-02 NOTE — PROGRESS NOTES
Subjective   Tio Fuentes is a 64 y.o. female here for a routine exam.  She has no postmenopausal bleeding or discharge.  No dysuria or change in bowel habits.  We discussed that the Vagifem that was started 2024 has not been effective for pain with intercourse.  We discussed other options.    We discussed her daughter that is seen here, Emily, is due with her third granddaughter soon.    Personal health questionnaire reviewed: yes.     Gynecologic History  No LMP recorded. Patient is postmenopausal.  Contraception: post menopausal status  Last Pap: 23. Results were: normal  Last mammogram: 25. Results were: normal    Obstetric History  OB History    Para Term  AB Living   4 2 2      SAB IAB Ectopic Multiple Live Births             # Outcome Date GA Lbr Malcolm/2nd Weight Sex Type Anes PTL Lv   4             3             2 Term            1 Term                Objective   Constitutional: Alert and in no acute distress. Well developed, well nourished.   Head and Face: Head and face: Normal.    Eyes: Normal external exam - nonicteric sclera, extraocular movements intact (EOMI) and no ptosis.   Neck: No neck asymmetry. Supple. Thyroid not enlarged and there were no palpable thyroid nodules.    Pulmonary: No respiratory distress.   Chest: Breasts: Normal appearance, no nipple discharge and no skin changes. Palpation of breasts and axillae: No palpable mass and no axillary lymphadenopathy.   Abdomen: Soft nontender; no abdominal mass palpated. No organomegaly. No hernias.   Genitourinary: External genitalia: Normal. No inguinal lymphadenopathy. Bartholin's Urethral and Skenes Glands: Normal. Urethra: Normal.  Bladder: Normal on palpation. Vagina: Normal. Cervix: Normal.  Uterus: Normal.  Right Adnexa/parametria: Normal.  Left Adnexa/parametria: Normal.  Inspection of Perianal Area: Normal.   Musculoskeletal: No joint swelling seen, normal movements of all extremities.    Skin: Normal skin color and pigmentation, normal skin turgor, and no rash.   Neurologic: Non-focal. Grossly intact.   Psychiatric: Alert and oriented x 3. Affect normal to patient baseline. Mood: Appropriate.  Physical Exam     Assessment/Plan   Healthy female exam.  This is a 64-year-old female with a normal exam.  No Pap smear was sent, she is high risk HPV negative in 2023.    We discussed switching from the generic Vagifem to estradiol cream.  It will be a pea-sized amount in the vagina 3 times weekly (Monday- Wednesday- Friday).  I recommend giving it about 8 to 12 weeks for the full effect.  We discussed other options such as Replens, samples of Revaree were also given to the patient.  We did briefly discuss pelvic floor physical therapy if necessary.    Her routine mammogram was ordered with tomosynthesis, and is due in April 2026.  I will see her in 1 year.  Education reviewed: self breast exams.  Mammogram ordered.

## 2025-06-10 ENCOUNTER — TELEPHONE (OUTPATIENT)
Dept: PRIMARY CARE | Facility: CLINIC | Age: 65
End: 2025-06-10
Payer: COMMERCIAL

## 2025-06-10 DIAGNOSIS — J06.9 VIRAL UPPER RESPIRATORY TRACT INFECTION: Primary | ICD-10-CM

## 2025-06-10 RX ORDER — AZITHROMYCIN 250 MG/1
TABLET, FILM COATED ORAL
Qty: 6 TABLET | Refills: 0 | Status: SHIPPED | OUTPATIENT
Start: 2025-06-10 | End: 2025-06-15

## 2025-06-10 NOTE — TELEPHONE ENCOUNTER
She emailed asking for abx for a sinus infection. She is leaving to go out of town on Thursday and wants to feel better.

## 2025-07-11 DIAGNOSIS — I10 PRIMARY HYPERTENSION: ICD-10-CM

## 2025-07-11 RX ORDER — TRIAMTERENE AND HYDROCHLOROTHIAZIDE 37.5; 25 MG/1; MG/1
1 CAPSULE ORAL DAILY
Qty: 90 CAPSULE | Refills: 0 | Status: SHIPPED | OUTPATIENT
Start: 2025-07-11 | End: 2025-07-11 | Stop reason: SDUPTHER

## 2025-07-11 RX ORDER — TRIAMTERENE AND HYDROCHLOROTHIAZIDE 37.5; 25 MG/1; MG/1
1 CAPSULE ORAL DAILY
Qty: 90 CAPSULE | Refills: 2 | Status: SHIPPED | OUTPATIENT
Start: 2025-07-11

## 2025-08-07 DIAGNOSIS — Z00.00 ANNUAL PHYSICAL EXAM: ICD-10-CM

## 2025-09-17 ENCOUNTER — APPOINTMENT (OUTPATIENT)
Dept: PRIMARY CARE | Facility: CLINIC | Age: 65
End: 2025-09-17
Payer: COMMERCIAL

## 2026-04-09 ENCOUNTER — APPOINTMENT (OUTPATIENT)
Dept: RADIOLOGY | Facility: CLINIC | Age: 66
End: 2026-04-09
Payer: COMMERCIAL

## 2026-06-02 ENCOUNTER — APPOINTMENT (OUTPATIENT)
Facility: CLINIC | Age: 66
End: 2026-06-02
Payer: COMMERCIAL